# Patient Record
Sex: FEMALE | Race: WHITE
[De-identification: names, ages, dates, MRNs, and addresses within clinical notes are randomized per-mention and may not be internally consistent; named-entity substitution may affect disease eponyms.]

---

## 2019-02-20 ENCOUNTER — HOSPITAL ENCOUNTER (OUTPATIENT)
Dept: HOSPITAL 88 - RAD | Age: 44
End: 2019-02-20
Payer: COMMERCIAL

## 2019-02-20 DIAGNOSIS — R07.89: ICD-10-CM

## 2019-02-20 DIAGNOSIS — S20.211A: Primary | ICD-10-CM

## 2019-02-20 PROCEDURE — 71101 X-RAY EXAM UNILAT RIBS/CHEST: CPT

## 2019-02-21 NOTE — DIAGNOSTIC IMAGING REPORT
Exam:  Right rib radiographs



History:  Contusion on right chest wall, pain



Comparison: None.



Findings: The lungs are well-inflated and without focal airspace consolidation,

pleural effusion, or pneumothorax. Cardiomediastinal contour and pulmonary

vasculature are within normal limits.



No displaced right rib fractures.



Impression:



No displaced right rib fracture or pneumothorax.





 



Signed by: Dr. Fabrice Austin M.D. on 2/21/2019 7:22 AM